# Patient Record
Sex: FEMALE | Race: WHITE | NOT HISPANIC OR LATINO | Employment: OTHER | URBAN - METROPOLITAN AREA
[De-identification: names, ages, dates, MRNs, and addresses within clinical notes are randomized per-mention and may not be internally consistent; named-entity substitution may affect disease eponyms.]

---

## 2017-01-17 ENCOUNTER — PATIENT OUTREACH (OUTPATIENT)
Dept: HEALTH INFORMATION MANAGEMENT | Facility: OTHER | Age: 82
End: 2017-01-17

## 2017-01-24 ENCOUNTER — PATIENT OUTREACH (OUTPATIENT)
Dept: HEALTH INFORMATION MANAGEMENT | Facility: OTHER | Age: 82
End: 2017-01-24

## 2017-02-02 ENCOUNTER — PATIENT OUTREACH (OUTPATIENT)
Dept: HEALTH INFORMATION MANAGEMENT | Facility: OTHER | Age: 82
End: 2017-02-02

## 2017-02-02 NOTE — PROGRESS NOTES
Telephone contact with Tee, ting's .   Telephone contact w/Tee, provided an introduction of myself; my role and the reason for my call.    Tee gives verbal consent for CM services at this time for oversight of Ting's medical conditions  for 30 days post hospitalization rural care coordination.    Tee states Ting is coming along well. He states that Ting has been staying at their daughters [place] on same property. [facility] is coming out 3 times a week and working with Ting.    Tee states next call to call [facility], Taylor [phone].    Developed goals with Tee for Ting provided Tee with my contact information.    Plan to follow up with Ting in approx one week; in the interim,  Ting/Tee is able to call me with questions or concerns.

## 2017-02-07 ENCOUNTER — APPOINTMENT (OUTPATIENT)
Dept: NEUROLOGY | Facility: MEDICAL CENTER | Age: 82
End: 2017-02-07
Payer: MEDICARE

## 2017-02-10 ENCOUNTER — PATIENT OUTREACH (OUTPATIENT)
Dept: HEALTH INFORMATION MANAGEMENT | Facility: OTHER | Age: 82
End: 2017-02-10

## 2017-02-11 NOTE — PROGRESS NOTES
Telephone contact with Taylor, Ting's daughter for rural care coordination.    Taylor states Ting is doing really well. [facility] is following and is most likely going to dc after this week.    No questions or concerns at this time. Plan to follow up with Ting in approx two weeks; in the interim,  Ting is able to call me with questions or concerns.

## 2017-02-24 ENCOUNTER — PATIENT OUTREACH (OUTPATIENT)
Dept: HEALTH INFORMATION MANAGEMENT | Facility: OTHER | Age: 82
End: 2017-02-24

## 2017-03-07 ENCOUNTER — APPOINTMENT (OUTPATIENT)
Dept: NEUROLOGY | Facility: MEDICAL CENTER | Age: 82
End: 2017-03-07
Payer: MEDICARE

## 2017-03-07 ENCOUNTER — PATIENT OUTREACH (OUTPATIENT)
Dept: HEALTH INFORMATION MANAGEMENT | Facility: OTHER | Age: 82
End: 2017-03-07

## 2017-03-16 ENCOUNTER — PATIENT OUTREACH (OUTPATIENT)
Dept: HEALTH INFORMATION MANAGEMENT | Facility: OTHER | Age: 82
End: 2017-03-16

## 2017-03-16 NOTE — Clinical Note
March 16, 2017        Ting Esteban  [address]  [place] [address]        Dear Ting:      My name is Keiko and I am a Nurse Care Coordinator with [facility] that follows up with everyone that comes in [facility] from the [facility].      I have been trying to reach you by telephone to follow up with you after your hospital stay and see if there was anything you needed and check and see how you are doing since being home.    If you would like additional assistance or information regarding your healthcare, managing your health care challenges or connecting with community resources, please contact Keiko at [phone].    If you have any questions or concerns, please don't hesitate to call.        Sincerely,    Keiko Garza RN BSN   Care Coordination   [address] [place] [address], [place]  [address]  P: [phone]   [email or web address]        Electronically Signed

## 2017-03-16 NOTE — PROGRESS NOTES
Message left with name and number 3rd time for rural care coordination.     After three unsuccessful telephone attempts patient has not returned call to CC to discuss care coordination management. Letter sent to address on file.     Closed case secondary to lack of engagement on part of patient.